# Patient Record
Sex: FEMALE | Race: WHITE | Employment: FULL TIME | URBAN - METROPOLITAN AREA
[De-identification: names, ages, dates, MRNs, and addresses within clinical notes are randomized per-mention and may not be internally consistent; named-entity substitution may affect disease eponyms.]

---

## 2023-11-15 ENCOUNTER — ANCILLARY PROCEDURE (OUTPATIENT)
Dept: RADIOLOGY | Facility: CLINIC | Age: 38
End: 2023-11-15
Payer: COMMERCIAL

## 2023-11-15 ENCOUNTER — OFFICE VISIT (OUTPATIENT)
Dept: ORTHOPEDIC SURGERY | Facility: CLINIC | Age: 38
End: 2023-11-15
Payer: COMMERCIAL

## 2023-11-15 DIAGNOSIS — M25.572 LEFT ANKLE PAIN, UNSPECIFIED CHRONICITY: ICD-10-CM

## 2023-11-15 DIAGNOSIS — M76.822 POSTERIOR TIBIAL TENDON DYSFUNCTION (PTTD) OF LEFT LOWER EXTREMITY: Primary | ICD-10-CM

## 2023-11-15 PROCEDURE — 99213 OFFICE O/P EST LOW 20 MIN: CPT | Performed by: STUDENT IN AN ORGANIZED HEALTH CARE EDUCATION/TRAINING PROGRAM

## 2023-11-15 PROCEDURE — L1902 AFO ANKLE GAUNTLET PRE OTS: HCPCS | Performed by: STUDENT IN AN ORGANIZED HEALTH CARE EDUCATION/TRAINING PROGRAM

## 2023-11-15 PROCEDURE — 73610 X-RAY EXAM OF ANKLE: CPT | Mod: LT,FY

## 2023-11-15 PROCEDURE — 99203 OFFICE O/P NEW LOW 30 MIN: CPT | Performed by: STUDENT IN AN ORGANIZED HEALTH CARE EDUCATION/TRAINING PROGRAM

## 2023-11-15 PROCEDURE — 73610 X-RAY EXAM OF ANKLE: CPT | Mod: LEFT SIDE | Performed by: RADIOLOGY

## 2023-11-15 RX ORDER — DICLOFENAC SODIUM 10 MG/G
4 GEL TOPICAL 4 TIMES DAILY PRN
Qty: 450 G | Refills: 0 | Status: SHIPPED | OUTPATIENT
Start: 2023-11-15 | End: 2024-01-14

## 2023-11-15 ASSESSMENT — PAIN - FUNCTIONAL ASSESSMENT: PAIN_FUNCTIONAL_ASSESSMENT: 0-10

## 2023-11-15 ASSESSMENT — PAIN DESCRIPTION - DESCRIPTORS: DESCRIPTORS: SHARP

## 2023-11-15 ASSESSMENT — PAIN SCALES - GENERAL: PAINLEVEL_OUTOF10: 5 - MODERATE PAIN

## 2023-11-16 NOTE — PROGRESS NOTES
ORTHOPAEDIC SURGERY HISTORY & PHYSICAL     Chief Complaint:  Left ankle pain    History Of Present Illness  Maxine Marie is a 38 y.o. female who presents for evaluation of left ankle pain.  Patient reports a several month history of left ankle pain.  Patient pain is typically rated as 5 out of 10.  This began atraumatically and is not associated with any numbness, tingling or weakness.  Patient has noticed more pain with prolonged standing and walking and when going up onto her toes.  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  Patient has never had a pain like this in the past.  She has not used any orthotics, braces or inserts for her shoes.  She is also not formally tried HEP, PT or bracing.     Past Medical History  Past Medical History:   Diagnosis Date    Obesity complicating pregnancy, unspecified trimester 07/07/2014    Obesity in pregnancy, antepartum    Other conditions influencing health status 07/07/2014    History of pregnancy    Personal history of other diseases of the respiratory system     History of asthma    Personal history of other specified conditions     History of chest pain       Surgical History  Recent Surgeries in Orthopaedic Surgery            No cases to display             Social History  Social History     Socioeconomic History    Marital status:      Spouse name: None    Number of children: None    Years of education: None    Highest education level: None   Occupational History    None   Tobacco Use    Smoking status: None    Smokeless tobacco: None   Substance and Sexual Activity    Alcohol use: None    Drug use: None    Sexual activity: None   Other Topics Concern    None   Social History Narrative    None     Social Determinants of Health     Financial Resource Strain: Not on file   Food Insecurity: Not on file   Transportation Needs: Not on file   Physical Activity: Not on file   Stress: Not on file   Social Connections:  Not on file   Intimate Partner Violence: Not on file   Housing Stability: Not on file       Family History  No family history on file.     Allergies  No Known Allergies    Review of Systems  REVIEW OF SYSTEMS  Constitutional: no unplanned weight loss  Psychiatric: no suicidal ideation  ENT: no vision changes, no sinus problems  Pulmonary: no shortness of breath during office visit today  Lymphatic: no enlarged lymph nodes  Cardiovascular: no chest pain or shortness of breath during office visit today  Gastrointestinal: no stomach problems  Genitourinary: no dysuria   Skin: no rashes  Endocrine: no thyroid problems  Neurological: no headache, no numbness  Hematological: no easy bruising  Musculoskeletal: Left ankle pain    Physical Exam  PHYSICAL EXAMINATION  Constitutional Exam: well developed and well nourished  Psychiatric Exam: alert and oriented, appropriate mood and behavior  Eye Exam: EOMI  Pulmonary Exam: breathing non-labored, no apparent distress  Lymphatic exam: no appreciable lymphadenopathy in the lower extremities  Cardiovascular exam: RRR to peripheral palpation, DP pulses 2+, PT 2+, toes are pink with good capillary refill, no pitting edema  Skin exam: no open lesions, rashes, abrasions or ulcerations  Neurological exam: sensation to light touch intact in both lower extremities in peripheral and dermatomal distributions (except for any abnormalities noted in musculoskeletal exam)    Musculoskeletal exam: Left lower extremity examination.  Patient pain localizes about the plantar medial ankle.  She is focally tender to palpation along the PTT, there is mild bogginess.  Patient is nontender to palpation about the medial longitudinal band of the plantar fascia.  Patient has neutral hindfoot alignment with relative cavus arch posture and no significant forefoot deformity noted.  Patient has sensation intact to light touch grossly in a saphenous, sural, superficial peroneal, deep peroneal and tibial nerve  distribution.  She has 5 out of 5 strength in plantarflexion, dorsiflexion and EHL.  She has 4- out of 5 strength in inversion, pain limited and 5 out of 5 strength in eversion.  Patient has 2+ DP/PT pulse palpated.    Last Recorded Vitals  There were no vitals taken for this visit.    Laboratory Results    No results found for this or any previous visit (from the past 24 hour(s)).    Radiology Results   X-ray imaging 3 view weightbearing left ankle reviewed from 11/15/2023 and independently evaluated by me demonstrates no acute fracture or dislocation.  Ankle mortise remains well aligned.  Incidental note of calcaneal enthesophyte and plantar calcaneal spur.    Assessment/Plan:  38-year-old female who my impression has left PTTD.  I have reviewed the diagnosis and treatment options extensively with the patient.  In my impression, the patient may continue weightbearing as tolerated in her left lower extremity.  I will provide her with a formal referral to physical therapy to work on PTT stretching and strengthening.  I will provide her with a prescription for a topical anti-inflammatory for use directly over the painful area.  I encouraged the patient to walk for necessity and to take great care with respect to overloading her foot.  I encouraged the patient to obtain OTC arch support for offloading of her PTT.  I will plan to see the patient back in approximately 6 weeks to monitor response to treatment.  Upon return, patient would not require further imaging.    Tristan Machuca MD, KARLOS  Department of Orthopaedic Surgery  [facility]    The diagnosis and treatment plan were reviewed with the patient. All questions were answered. The patient verbalized understanding of the treatment plan. There were no barriers to understanding identified.    Note dictated with PiAuto software.  Completed without full type editing and sent to avoid delay.

## 2023-12-27 ENCOUNTER — APPOINTMENT (OUTPATIENT)
Dept: ORTHOPEDIC SURGERY | Facility: CLINIC | Age: 38
End: 2023-12-27
Payer: COMMERCIAL

## 2025-04-25 ENCOUNTER — EVALUATION (OUTPATIENT)
Dept: PHYSICAL THERAPY | Facility: CLINIC | Age: 40
End: 2025-04-25
Payer: COMMERCIAL

## 2025-04-25 DIAGNOSIS — M25.562 CHRONIC PAIN OF LEFT KNEE: Chronic | ICD-10-CM

## 2025-04-25 DIAGNOSIS — M70.52 BURSITIS OF LEFT KNEE: Primary | ICD-10-CM

## 2025-04-25 DIAGNOSIS — R26.9 GAIT ABNORMALITY: Chronic | ICD-10-CM

## 2025-04-25 DIAGNOSIS — G89.29 CHRONIC PAIN OF LEFT KNEE: Chronic | ICD-10-CM

## 2025-04-25 PROCEDURE — 97110 THERAPEUTIC EXERCISES: CPT | Mod: GP

## 2025-04-25 PROCEDURE — 97162 PT EVAL MOD COMPLEX 30 MIN: CPT | Mod: GP

## 2025-04-25 ASSESSMENT — PAIN SCALES - GENERAL: PAINLEVEL_OUTOF10: 2

## 2025-04-25 ASSESSMENT — ENCOUNTER SYMPTOMS
PAIN SCALE: 2
OCCASIONAL FEELINGS OF UNSTEADINESS: 1
PAIN SCALE AT LOWEST: 1
LOSS OF SENSATION IN FEET: 0
DEPRESSION: 0
PAIN SCALE AT HIGHEST: 8

## 2025-04-25 ASSESSMENT — PAIN - FUNCTIONAL ASSESSMENT: PAIN_FUNCTIONAL_ASSESSMENT: 0-10

## 2025-04-25 ASSESSMENT — ACTIVITIES OF DAILY LIVING (ADL): POOR_BALANCE: 1

## 2025-04-25 NOTE — LETTER
April 28, 2025    Micah Martel MD  [address]  [place] [address]    Patient: Maxine Marie   YOB: 1985   Date of Visit: 4/25/2025       Dear Micah Martel MD  [address]  [place] [address]    The attached plan of care is being sent to you because your patient’s medical reimbursement requires that you certify the plan of care. Your signature is required to allow uninterrupted insurance coverage.      You may indicate your approval by signing below and faxing this form back to us at Dept Fax: [phone].    Please call Dept: [phone] with any questions or concerns.    Thank you for this referral,        Steve Dunlap PT  [facility] PHYSICIAN [place]  [facility] PHYSICIAN [place]  [address]   [facility] [id number] [phone]    Payer: Payor: [employer] / Plan: [employer] / Product Type: *No Product type* /                                                                         Date:     Dear Steve Dunlap, PT,     Re: Ms. Maxine Marie, MRN:[id number]    I certify that I have reviewed the attached plan of care and it is medically necessary for Ms. Maxine Marie (1985) who is under my care.          ______________________________________                    _________________  Provider name and credentials                                           Date and time                                                                                           Plan of Care 4/25/25   Effective from: 4/25/2025  Effective to: 7/24/2025    Active  Plan ID: [id number]               Participants as of 4/25/2025      Name Type Comments Contact Info    Micah Martel MD Referring Provider  [phone]    Steve Dunlap PT Physical Therapist            Last Plan Note       Author: Steve Dunlap PT Status: Incomplete Last edited: 4/25/2025  1:30 PM         Physical Therapy Evaluation and Treatment     Patient Name: Maxine Marie  MRN: [id number]  Encounter  "date: 4/25/2025       Visit # 1 of 16  Visits/Dates Authorized: 2025: [employer] - NO AUTH / 100% COVERAGE / 30V/yr - Check w/pt, 0 used per Epic / Availity [id number]   / ds 4/24/25 //  CPT 24246, 20561 - covered - No auth until auth is required for therapy //      Current Problem:   Problem List Items Addressed This Visit    None  Visit Diagnoses         Codes      Bursitis of left knee     M70.52          Precautions:          Steadi Fall Risk  One or more falls in the last year?    How many Times?    Was the patient injured in the fall?    Has trouble stepping onto curb?    Advised to use a cane or walker to get around safely?    Often has to rush to toilet?    Feels unsteady when walking?    Has lost some feeling in feet?    Often feels sad or depressed?    Steadies self on furniture while walking at home?    Takes medicine that makes them feel lightheaded or more tired than usual?    Worried about Falling?    Takes medicine to sleep or improve mood?    Needs to push with hands when rising from a chair?        Subjective Evaluation    History of Present Illness  Mechanism of injury: General:  Reason for Referral: ***  Referred By: ***  Past Medical History Relevant to Rehab: ***  Family/Caregiver Present: ***     Precautions:   STEADI Fall Risk Score 3  Additional worries with negotiation of stairs or getting in and out of shower secondary to reduced L knee ROM.   Medical Precautions: ***     Medical History Form: Reviewed (scanned into chart)    Allergies: no known allergies     Subjective:    Pt is a 38 y/o female who presents to outpatient physical therapy for complaints of L knee pain. 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 Pt is a mother of 3 kids with a supportive spouse. She reports she is very busy and is on her feet a lot. Notices pain increases when standing up after taking work break. Stiffness and pain increases upon standing after sitting for 15-20 minutes. \"I just feel " "unstable when I'm walking.\"  Additional complaints of R ankle pain (on the lateral aspect).  Home set-up split level with handrails   1STE no handrail  Functional limitations: work  Assistive device: no  Caregiver / family support system: yes  Aggravating symptoms: stairs, returning to activity levels after 15-20 seated rest break.   Relieving symptoms: rest, Tylenol (as needed), occasionally icing.       Pain  Current pain ratin  At best pain ratin  At worst pain ratin              Objective     Tenderness   Left Knee   Tenderness in the ITB, LCL (proximal) and MCL (proximal).     Right Knee   Tenderness in the ITB, LCL (proximal) and MCL (proximal).     Additional Tenderness Details  TTP   Medial and lateral   VMO  Medial and lateral aspects of proximal GS    Active Range of Motion   Left Knee   Flexion: 104 degrees with pain  Extension: WFL    Right Knee   Flexion: 116 degrees   Extension: WFL  Left Ankle/Foot   Plantar flexion: WFL  Inversion: WFL  Eversion: WFL    Right Ankle/Foot   Plantar flexion: WFL  Inversion: WFL  Eversion: WFL    Additional Active Range of Motion Details  Significant tightness in L quad (unable to get to 90 deg)  Tightness with dorsiflexion and HS stretch       Strength/Myotome Testing     Left Ankle/Foot   Dorsiflexion: 4+  Plantar flexion: 4+    Right Ankle/Foot   Dorsiflexion: 4  Plantar flexion: 4    Tests     Left Knee   Positive varus stress test at 30 degrees.   Negative valgus stress test at 30 degrees.     Ambulation     Ambulation: Stairs   Ascend stairs: independent  Pattern: reciprocal  Railings: two rails  Descend stairs: independent  Pattern: non-reciprocal  Railings: without rails    Additional Stairs Ambulation Details  Step too pattern for descending LLE leading    Observational Gait   Gait: antalgic   Decreased walking speed and stride length.     Additional Observational Gait Details  Reduced WB on LLE       Vitals:       Objective     Tenderness   Left " Knee   Tenderness in the ITB, LCL (proximal) and MCL (proximal).     Right Knee   Tenderness in the ITB, LCL (proximal) and MCL (proximal).     Additional Tenderness Details  TTP   Medial and lateral   VMO  Medial and lateral aspects of proximal GS    Active Range of Motion   Left Knee   Flexion: 104 degrees with pain  Extension: WFL    Right Knee   Flexion: 116 degrees   Extension: WFL  Left Ankle/Foot   Plantar flexion: WFL  Inversion: WFL  Eversion: WFL    Right Ankle/Foot   Plantar flexion: WFL  Inversion: WFL  Eversion: WFL    Additional Active Range of Motion Details  Significant tightness in L quad (unable to get to 90 deg)  Tightness with dorsiflexion and HS stretch       Strength/Myotome Testing     Left Ankle/Foot   Dorsiflexion: 4+  Plantar flexion: 4+    Right Ankle/Foot   Dorsiflexion: 4  Plantar flexion: 4    Tests     Left Knee   Positive varus stress test at 30 degrees.   Negative valgus stress test at 30 degrees.     Ambulation     Ambulation: Stairs   Ascend stairs: independent  Pattern: reciprocal  Railings: two rails  Descend stairs: independent  Pattern: non-reciprocal  Railings: without rails    Additional Stairs Ambulation Details  Step too pattern for descending LLE leading    Observational Gait   Gait: antalgic   Decreased walking speed and stride length.     Additional Observational Gait Details  Reduced WB on LLE       Objective     Tenderness   Left Knee   Tenderness in the ITB, LCL (proximal) and MCL (proximal).     Right Knee   Tenderness in the ITB, LCL (proximal) and MCL (proximal).     Additional Tenderness Details  TTP   Medial and lateral   VMO  Medial and lateral aspects of proximal GS    Active Range of Motion   Left Knee   Flexion: 104 degrees with pain  Extension: WFL    Right Knee   Flexion: 116 degrees   Extension: WFL  Left Ankle/Foot   Plantar flexion: WFL  Inversion: WFL  Eversion: WFL    Right Ankle/Foot   Plantar flexion: WFL  Inversion: WFL  Eversion: WFL    Additional  Active Range of Motion Details  Significant tightness in L quad (unable to get to 90 deg)  Tightness with dorsiflexion and HS stretch       Strength/Myotome Testing     Left Ankle/Foot   Dorsiflexion: 4+  Plantar flexion: 4+    Right Ankle/Foot   Dorsiflexion: 4  Plantar flexion: 4    Tests     Left Knee   Positive varus stress test at 30 degrees.   Negative valgus stress test at 30 degrees.     Ambulation     Ambulation: Stairs   Ascend stairs: independent  Pattern: reciprocal  Railings: two rails  Descend stairs: independent  Pattern: non-reciprocal  Railings: without rails    Additional Stairs Ambulation Details  Step too pattern for descending LLE leading    Observational Gait   Gait: antalgic   Decreased walking speed and stride length.     Additional Observational Gait Details  Reduced WB on LLE            Balance:  {Balance Assessments:92580}    Other Outcome Measures:       Treatments:     Therapeutic Exercise 89302:     Neuromuscular Re-Ed 88415:     Therapeutic Activity 26566:     Gait Training 00450:     Manual Therapy 66681:     Modalities:   {Modalities Used:00620}      HEP / Access Codes:   Unable to perform SLR secondary to pain  Access Code: [email or web address]  URL: [email or web address]  Date: 04/25/2025  Prepared by: Steve Dunlap    Exercises  - Seated Gluteal Sets  - 2 x daily - 7 x weekly - 3 sets - 10 reps  - Supine Quad Set  - 2 x daily - 7 x weekly - 3 sets - 10 reps  - Supine Bridge with Gluteal Set and Spinal Articulation  - 2 x daily - 7 x weekly - 1 sets - 10 reps  - Supine Heel Slide  - 2 x daily - 7 x weekly - 2 sets - 10 reps    Assessment/Plan      {Complexities:87534}     Post-Treatment Symptoms:       Goals:

## 2025-04-25 NOTE — PROGRESS NOTES
Physical Therapy Evaluation and Treatment     Patient Name: Maxine Marie  MRN: [id number]  Encounter date: 4/25/2025  Time Calculation  Start Time: 1342  Stop Time: 1431  Time Calculation (min): 49 min  PT Evaluation Time Entry  PT Evaluation (Moderate) Time Entry: 34  PT Therapeutic Procedures Time Entry  Therapeutic Exercise Time Entry: 15    Visit # 1 of 16  Visits/Dates Authorized: 2025: CARESOJOSÉ MIGUEL MCAID - NO AUTH / 100% COVERAGE / 30V/yr - Check w/pt, 0 used per Epic / [employer] [id number]   / ds 4/24/25 //  CPT 20560, 20561 - covered - No auth until auth is required for therapy //      Current Problem:   Problem List Items Addressed This Visit           ICD-10-CM    Left knee pain (Chronic) M25.562    Gait abnormality (Chronic) R26.9    Bursitis of left knee - Primary M70.52    Relevant Orders    Follow Up In Physical Therapy     Precautions:  Precautions  STEADI Fall Risk Score (The score of 4 or more indicates an increased risk of falling): 3       Steadi Fall Risk  One or more falls in the last year? No  How many Times?    Was the patient injured in the fall?    Has trouble stepping onto curb? No  Advised to use a cane or walker to get around safely? No  Often has to rush to toilet? No  Feels unsteady when walking? Yes  Has lost some feeling in feet? No  Often feels sad or depressed? No  Steadies self on furniture while walking at home? No  Takes medicine that makes them feel lightheaded or more tired than usual? Yes  Worried about Falling? No  Takes medicine to sleep or improve mood? No  Needs to push with hands when rising from a chair? Yes      Subjective Evaluation    History of Present Illness  Mechanism of injury: General:  Reason for Referral: Bursitis of left knee [M70.52]  Referred By: Dr Martel  Past Medical History Relevant to Rehab: Right ankle injury, high BMI  Family/Caregiver Present: none     Precautions:   STEADI Fall Risk Score 3  Additional worries with negotiation of stairs or  "getting in and out of shower secondary to reduced L knee ROM.   Medical Precautions: dizziness and fainting, unusual fatigue, anxiety     Medical History Form: Reviewed (scanned into chart)    Allergies: no known allergies     Subjective:    Pt is a 38 y/o female who presents to outpatient physical therapy for complaints of L knee pain. 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 Pt is a mother of 3 kids with a supportive spouse. She reports she is very busy and is on her feet a lot. Notices pain increases when standing up after taking work break. Stiffness and pain increases upon standing after sitting for 15-20 minutes. \"I just feel unstable when I'm walking.\"  Additional complaints of R ankle pain (on the lateral aspect).  Home set-up split level with handrails   1STE no handrail  Functional limitations: work  Assistive device: no  Caregiver / family support system: yes  Aggravating symptoms: stairs, returning to activity levels after 15-20 seated rest break.   Relieving symptoms: rest, Tylenol (as needed), occasionally icing.       Pain  Current pain ratin  At best pain ratin  At worst pain ratin         Pain Assessment: 0-10  0-10 (Numeric) Pain Score: 2  Pain Location: Knee  Pain Orientation: Left    Objective     Tenderness   Left Knee   Tenderness in the ITB, lateral joint line, LCL (proximal), MCL (proximal) and medial joint line. No tenderness in the quadriceps tendon, superior patella and tibial tubercle.     Right Knee   Tenderness in the ITB, LCL (proximal) and MCL (proximal).     Additional Tenderness Details  TTP   Medial and lateral   VMO  Medial and lateral aspects of proximal GS    Active Range of Motion   Left Knee   Flexion: 104 degrees with pain  Extension: WFL    Right Knee   Flexion: 116 degrees   Extension: WFL  Left Ankle/Foot   Plantar flexion: WFL  Inversion: WFL  Eversion: WFL    Right Ankle/Foot   Plantar flexion: WFL  Inversion: WFL  Eversion: WFL    Additional " Active Range of Motion Details  Significant tightness in L quad (unable to get to 90 deg)  Tightness with dorsiflexion and HS stretch       Strength/Myotome Testing     Left Hip   Planes of Motion   Flexion: 3  Extension: 3  Abduction: 3+    Isolated Muscles   Gluteus gustavo: 3-    Right Hip   Planes of Motion   Flexion: 4+  Extension: 4-  Abduction: 4    Isolated Muscles   Gluteus maximums: 4    Left Knee   Flexion: 4  Extension: 4    Right Knee   Flexion: 4+  Extension: 4+    Left Ankle/Foot   Dorsiflexion: 4+  Plantar flexion: 4+    Right Ankle/Foot   Dorsiflexion: 4  Plantar flexion: 4    Additional Strength Details  Hip flexion tested with SLR in supine Unable to perform SLR with resistance on L Leg for hip flexion.     Tests     Left Knee   Positive varus stress test at 30 degrees.   Negative valgus stress test at 30 degrees.     Additional Tests Details  Held further orthopedic testing secondary to current symptoms and avoiding further flare up.    Swelling     Left Knee Girth Measurement (cm)   Joint line: 53 cm    Right Knee Girth Measurement (cm)   Joint line: 52 cm    Ambulation     Ambulation: Stairs   Ascend stairs: independent  Pattern: reciprocal  Railings: two rails  Descend stairs: independent  Pattern: non-reciprocal  Railings: without rails    Additional Stairs Ambulation Details  Step too pattern for descending LLE leading    Observational Gait   Gait: antalgic   Decreased walking speed and stride length.     Additional Observational Gait Details  Reduced WB on LLE       Vitals:       Objective     Tenderness   Left Knee   Tenderness in the ITB, lateral joint line, LCL (proximal), MCL (proximal) and medial joint line. No tenderness in the quadriceps tendon, superior patella and tibial tubercle.     Right Knee   Tenderness in the ITB, LCL (proximal) and MCL (proximal).     Additional Tenderness Details  TTP   Medial and lateral   VMO  Medial and lateral aspects of proximal GS    Active Range of  Motion   Left Knee   Flexion: 104 degrees with pain  Extension: WFL    Right Knee   Flexion: 116 degrees   Extension: WFL  Left Ankle/Foot   Plantar flexion: WFL  Inversion: WFL  Eversion: WFL    Right Ankle/Foot   Plantar flexion: WFL  Inversion: WFL  Eversion: WFL    Additional Active Range of Motion Details  Significant tightness in L quad (unable to get to 90 deg)  Tightness with dorsiflexion and HS stretch       Strength/Myotome Testing     Left Hip   Planes of Motion   Flexion: 3  Extension: 3  Abduction: 3+    Isolated Muscles   Gluteus gustavo: 3-    Right Hip   Planes of Motion   Flexion: 4+  Extension: 4-  Abduction: 4    Isolated Muscles   Gluteus maximums: 4    Left Knee   Flexion: 4  Extension: 4    Right Knee   Flexion: 4+  Extension: 4+    Left Ankle/Foot   Dorsiflexion: 4+  Plantar flexion: 4+    Right Ankle/Foot   Dorsiflexion: 4  Plantar flexion: 4    Additional Strength Details  Hip flexion tested with SLR in supine Unable to perform SLR with resistance on L Leg for hip flexion.     Tests     Left Knee   Positive varus stress test at 30 degrees.   Negative valgus stress test at 30 degrees.     Additional Tests Details  Held further orthopedic testing secondary to current symptoms and avoiding further flare up.    Swelling     Left Knee Girth Measurement (cm)   Joint line: 53 cm    Right Knee Girth Measurement (cm)   Joint line: 52 cm    Ambulation     Ambulation: Stairs   Ascend stairs: independent  Pattern: reciprocal  Railings: two rails  Descend stairs: independent  Pattern: non-reciprocal  Railings: without rails    Additional Stairs Ambulation Details  Step too pattern for descending LLE leading    Observational Gait   Gait: antalgic   Decreased walking speed and stride length.     Additional Observational Gait Details  Reduced WB on LLE       Objective     Tenderness   Left Knee   Tenderness in the ITB, lateral joint line, LCL (proximal), MCL (proximal) and medial joint line. No tenderness  in the quadriceps tendon, superior patella and tibial tubercle.     Right Knee   Tenderness in the ITB, LCL (proximal) and MCL (proximal).     Additional Tenderness Details  TTP   Medial and lateral   VMO  Medial and lateral aspects of proximal GS    Active Range of Motion   Left Knee   Flexion: 104 degrees with pain  Extension: WFL    Right Knee   Flexion: 116 degrees   Extension: WFL  Left Ankle/Foot   Plantar flexion: WFL  Inversion: WFL  Eversion: WFL    Right Ankle/Foot   Plantar flexion: WFL  Inversion: WFL  Eversion: WFL    Additional Active Range of Motion Details  Significant tightness in L quad (unable to get to 90 deg)  Tightness with dorsiflexion and HS stretch       Strength/Myotome Testing     Left Hip   Planes of Motion   Flexion: 3  Extension: 3  Abduction: 3+    Isolated Muscles   Gluteus gustavo: 3-    Right Hip   Planes of Motion   Flexion: 4+  Extension: 4-  Abduction: 4    Isolated Muscles   Gluteus maximums: 4    Left Knee   Flexion: 4  Extension: 4    Right Knee   Flexion: 4+  Extension: 4+    Left Ankle/Foot   Dorsiflexion: 4+  Plantar flexion: 4+    Right Ankle/Foot   Dorsiflexion: 4  Plantar flexion: 4    Additional Strength Details  Hip flexion tested with SLR in supine Unable to perform SLR with resistance on L Leg for hip flexion.     Tests     Left Knee   Positive varus stress test at 30 degrees.   Negative valgus stress test at 30 degrees.     Additional Tests Details  Held further orthopedic testing secondary to current symptoms and avoiding further flare up.    Swelling     Left Knee Girth Measurement (cm)   Joint line: 53 cm    Right Knee Girth Measurement (cm)   Joint line: 52 cm    Ambulation     Ambulation: Stairs   Ascend stairs: independent  Pattern: reciprocal  Railings: two rails  Descend stairs: independent  Pattern: non-reciprocal  Railings: without rails    Additional Stairs Ambulation Details  Step too pattern for descending LLE leading    Observational Gait   Gait:  "antalgic   Decreased walking speed and stride length.     Additional Observational Gait Details  Reduced WB on LLE            Balance:  Romberg: Firm Eyes Open 30 sec, Eyes Closed 30 sec   Tandem: Firm Eyes Open 20 sec, Eyes Closed <5 sec     Other Outcome Measures:  Other Measures  Other Outcome Measures: LEFS 45/80    Treatments:       Eval 34 min    Therapeutic Exercise 13268:   Extra time spend on performance / education / issuance of HEP  Exercises  - Seated Gluteal Sets  - 2 x daily - 7 x weekly - 3 sets - 10 reps  - Supine Quad Set  - 2 x daily - 7 x weekly - 3 sets - 10 reps  - Supine Bridge with Gluteal Set and Spinal Articulation  - 2 x daily - 7 x weekly - 1 sets - 10 reps  - Supine Heel Slide  - 2 x daily - 7 x weekly - 2 sets - 10 reps    HEP / Access Codes:   Unable to perform SLR secondary to pain  Access Code: [email or web address]  URL: [email or web address]  Date: 04/25/2025  Prepared by: Steve Dunlap    Exercises  - Seated Gluteal Sets  - 2 x daily - 7 x weekly - 3 sets - 10 reps  - Supine Quad Set  - 2 x daily - 7 x weekly - 3 sets - 10 reps  - Supine Bridge with Gluteal Set and Spinal Articulation  - 2 x daily - 7 x weekly - 1 sets - 10 reps  - Supine Heel Slide  - 2 x daily - 7 x weekly - 2 sets - 10 reps    Assessment & Plan     Assessment  Impairments: abnormal gait, abnormal muscle firing, abnormal or restricted ROM, activity intolerance, impaired balance, impaired physical strength, lacks appropriate home exercise program, pain with function and weight-bearing intolerance  Assessment details: Pt is a 38 y/o female who presents to outpatient physical therapy with \"Bursitis of left knee\" resulting in L knee pain. Pt prognosis to therapy is fair secondary to BMI, job related duties/tasks, reduced activity, and pain levels. However, pt is motivated to participate in therapy and come regularly. Pt presents with reduced ROM, strength, alterations in gait/balance, and lacks an appropriate HEP. Pt would " benefit from skilled PT services to address deficits noted above, improve strength, ROM, and balance/gait.  Prognosis: good    Plan  Planned modality interventions: cryotherapy, TENS, ultrasound, thermotherapy (hydrocollator packs), electrical stimulation/Russian stimulation and low level laser therapy  Other planned modality interventions: consider DN potentially however at eval pt is hesitant.  Planned therapy interventions: manual therapy, strengthening, stretching, therapeutic activities, functional ROM exercises, joint mobilization, flexibility, home exercise program, balance/weight-bearing training, gait training, soft tissue mobilization, neuromuscular re-education, motor coordination training, abdominal trunk stabilization, ADL retraining, body mechanics training, IADL retraining, muscle pump exercises, orthotic fitting/training and transfer training  Frequency: 2x week  Duration in visits: 16  Treatment plan discussed with: patient  Plan details: 1-2 x per week for 16 visits          Moderate complexity due to patient's clinical presentation being evolving with changing characteristics, with comorbidities/complexities to include BMI, job related duties, intolerance to WB and lack of activity levels, all of which may negatively impact rehab tolerance and progression.     Post-Treatment Symptoms:       Goals:   Active       PT Problem       PT Goal 1       Start:  04/25/25    Expected End:  05/30/25       Pt to improve pain free AROM of L knee to be comparable with RLE.         PT Goal 2       Start:  04/25/25    Expected End:  06/13/25       Pt will improve LE strength to 4+/5 MMT or greater to improve knee stability and ability to work, lift, titi loads with minimal pain.         PT Goal 3       Start:  04/25/25    Expected End:  06/13/25       Pt to improve LEFS score by x 1 LUCIA to improve ability to squat and perform stairs independently without pain.          Patient Stated Goal 1       Start:  04/25/25     Expected End:  06/13/25       Pt will be able to work a full shift at work with 2/10 pain or less at worst to improve pts ability to manage work related activities.         Patient Stated Goal 2       Start:  04/25/25    Expected End:  06/13/25       Pt will be able to initiate movement and ambulate after taking work break (20-30 min) without pain.

## 2025-04-25 NOTE — Clinical Note
April 28, 2025    Micah Martel MD  [address] [place] [address]  [address]  [place] [address]    Patient: Maxine Marie   YOB: 1985   Date of Visit: 4/25/2025       Dear Micah Martel MD  [address]  [address]  [place] [address]    The attached plan of care is being sent to you because your patient’s medical reimbursement requires that you certify the plan of care. Your signature is required to allow uninterrupted insurance coverage.      You may indicate your approval by signing below and faxing this form back to us at Dept Fax: [phone].    Please call Dept: [phone] with any questions or concerns.    Thank you for this referral,        Steve Dunlap PT  [facility] PHYSICIAN ALFREDO  [facility] PHYSICIAN ALFREDO  [address] [facility] [id number] [phone]    Payer: Payor: [employer] / Plan: [employer] / Product Type: *No Product type* /                                                                         Date:     Dear Steve Dunlap PT,     Re: Ms. Maxine Marie, MRN:[id number]    I certify that I have reviewed the attached plan of care and it is medically necessary for Ms. Maxine Marie (1985) who is under my care.          ______________________________________                    _________________  Provider name and credentials                                           Date and time                                                                                           Plan of Care 4/25/25   Effective from: 4/25/2025  Effective to: 7/24/2025    Plan ID: [id number]            Participants as of Finalize on 4/28/2025    Name Type Comments Contact Info    Micah Martel MD Referring Provider  [phone]    Steve Dunlap PT Physical Therapist         Last Plan Note     Author: Steve Dunlap PT Status: Sign when Signing Visit Last edited: 4/25/2025  1:30 PM       Physical Therapy Evaluation and Treatment     Patient Name: Maxine Marie  MRN:  [id number]  Encounter date: 4/25/2025  Time Calculation  Start Time: 1342  Stop Time: 1431  Time Calculation (min): 49 min  PT Evaluation Time Entry  PT Evaluation (Moderate) Time Entry: 34  PT Therapeutic Procedures Time Entry  Therapeutic Exercise Time Entry: 15    Visit # 1 of 16  Visits/Dates Authorized: 2025: [employer] - NO AUTH / 100% COVERAGE / 30V/yr - Check w/pt, 0 used per Epic / Availity [id number]   / ds 4/24/25 //  CPT 20560, 20561 - covered - No auth until auth is required for therapy //      Current Problem:   Problem List Items Addressed This Visit           ICD-10-CM    Left knee pain (Chronic) M25.562    Gait abnormality (Chronic) R26.9    Bursitis of left knee - Primary M70.52    Relevant Orders    Follow Up In Physical Therapy     Precautions:  Precautions  STEADI Fall Risk Score (The score of 4 or more indicates an increased risk of falling): 3       Steadi Fall Risk  One or more falls in the last year? No  How many Times?    Was the patient injured in the fall?    Has trouble stepping onto curb? No  Advised to use a cane or walker to get around safely? No  Often has to rush to toilet? No  Feels unsteady when walking? Yes  Has lost some feeling in feet? No  Often feels sad or depressed? No  Steadies self on furniture while walking at home? No  Takes medicine that makes them feel lightheaded or more tired than usual? Yes  Worried about Falling? No  Takes medicine to sleep or improve mood? No  Needs to push with hands when rising from a chair? Yes      Subjective Evaluation    History of Present Illness  Mechanism of injury: General:  Reason for Referral: Bursitis of left knee [M70.52]  Referred By: Dr Martel  Past Medical History Relevant to Rehab: Right ankle injury, high BMI  Family/Caregiver Present: none     Precautions:   STEADI Fall Risk Score 3  Additional worries with negotiation of stairs or getting in and out of shower secondary to reduced L knee ROM.   Medical Precautions:  "dizziness and fainting, unusual fatigue, anxiety     Medical History Form: Reviewed (scanned into chart)    Allergies: no known allergies     Subjective:    Pt is a 38 y/o female who presents to outpatient physical therapy for complaints of L knee pain. 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 Pt is a mother of 3 kids with a supportive spouse. She reports she is very busy and is on her feet a lot. Notices pain increases when standing up after taking work break. Stiffness and pain increases upon standing after sitting for 15-20 minutes. \"I just feel unstable when I'm walking.\"  Additional complaints of R ankle pain (on the lateral aspect).  Home set-up split level with handrails   1STE no handrail  Functional limitations: work  Assistive device: no  Caregiver / family support system: yes  Aggravating symptoms: stairs, returning to activity levels after 15-20 seated rest break.   Relieving symptoms: rest, Tylenol (as needed), occasionally icing.       Pain  Current pain ratin  At best pain ratin  At worst pain ratin         Pain Assessment: 0-10  0-10 (Numeric) Pain Score: 2  Pain Location: Knee  Pain Orientation: Left    Objective     Tenderness   Left Knee   Tenderness in the ITB, lateral joint line, LCL (proximal), MCL (proximal) and medial joint line. No tenderness in the quadriceps tendon, superior patella and tibial tubercle.     Right Knee   Tenderness in the ITB, LCL (proximal) and MCL (proximal).     Additional Tenderness Details  TTP   Medial and lateral   VMO  Medial and lateral aspects of proximal GS    Active Range of Motion   Left Knee   Flexion: 104 degrees with pain  Extension: WFL    Right Knee   Flexion: 116 degrees   Extension: WFL  Left Ankle/Foot   Plantar flexion: WFL  Inversion: WFL  Eversion: WFL    Right Ankle/Foot   Plantar flexion: WFL  Inversion: WFL  Eversion: WFL    Additional Active Range of Motion Details  Significant tightness in L quad (unable to get to 90 " deg)  Tightness with dorsiflexion and HS stretch       Strength/Myotome Testing     Left Hip   Planes of Motion   Flexion: 3  Extension: 3  Abduction: 3+    Isolated Muscles   Gluteus gustavo: 3-    Right Hip   Planes of Motion   Flexion: 4+  Extension: 4-  Abduction: 4    Isolated Muscles   Gluteus maximums: 4    Left Knee   Flexion: 4  Extension: 4    Right Knee   Flexion: 4+  Extension: 4+    Left Ankle/Foot   Dorsiflexion: 4+  Plantar flexion: 4+    Right Ankle/Foot   Dorsiflexion: 4  Plantar flexion: 4    Additional Strength Details  Hip flexion tested with SLR in supine Unable to perform SLR with resistance on L Leg for hip flexion.     Tests     Left Knee   Positive varus stress test at 30 degrees.   Negative valgus stress test at 30 degrees.     Additional Tests Details  Held further orthopedic testing secondary to current symptoms and avoiding further flare up.    Swelling     Left Knee Girth Measurement (cm)   Joint line: 53 cm    Right Knee Girth Measurement (cm)   Joint line: 52 cm    Ambulation     Ambulation: Stairs   Ascend stairs: independent  Pattern: reciprocal  Railings: two rails  Descend stairs: independent  Pattern: non-reciprocal  Railings: without rails    Additional Stairs Ambulation Details  Step too pattern for descending LLE leading    Observational Gait   Gait: antalgic   Decreased walking speed and stride length.     Additional Observational Gait Details  Reduced WB on LLE       Vitals:       Objective     Tenderness   Left Knee   Tenderness in the ITB, lateral joint line, LCL (proximal), MCL (proximal) and medial joint line. No tenderness in the quadriceps tendon, superior patella and tibial tubercle.     Right Knee   Tenderness in the ITB, LCL (proximal) and MCL (proximal).     Additional Tenderness Details  TTP   Medial and lateral   VMO  Medial and lateral aspects of proximal GS    Active Range of Motion   Left Knee   Flexion: 104 degrees with pain  Extension: WFL    Right Knee    Flexion: 116 degrees   Extension: WFL  Left Ankle/Foot   Plantar flexion: WFL  Inversion: WFL  Eversion: WFL    Right Ankle/Foot   Plantar flexion: WFL  Inversion: WFL  Eversion: WFL    Additional Active Range of Motion Details  Significant tightness in L quad (unable to get to 90 deg)  Tightness with dorsiflexion and HS stretch       Strength/Myotome Testing     Left Hip   Planes of Motion   Flexion: 3  Extension: 3  Abduction: 3+    Isolated Muscles   Gluteus gustavo: 3-    Right Hip   Planes of Motion   Flexion: 4+  Extension: 4-  Abduction: 4    Isolated Muscles   Gluteus maximums: 4    Left Knee   Flexion: 4  Extension: 4    Right Knee   Flexion: 4+  Extension: 4+    Left Ankle/Foot   Dorsiflexion: 4+  Plantar flexion: 4+    Right Ankle/Foot   Dorsiflexion: 4  Plantar flexion: 4    Additional Strength Details  Hip flexion tested with SLR in supine Unable to perform SLR with resistance on L Leg for hip flexion.     Tests     Left Knee   Positive varus stress test at 30 degrees.   Negative valgus stress test at 30 degrees.     Additional Tests Details  Held further orthopedic testing secondary to current symptoms and avoiding further flare up.    Swelling     Left Knee Girth Measurement (cm)   Joint line: 53 cm    Right Knee Girth Measurement (cm)   Joint line: 52 cm    Ambulation     Ambulation: Stairs   Ascend stairs: independent  Pattern: reciprocal  Railings: two rails  Descend stairs: independent  Pattern: non-reciprocal  Railings: without rails    Additional Stairs Ambulation Details  Step too pattern for descending LLE leading    Observational Gait   Gait: antalgic   Decreased walking speed and stride length.     Additional Observational Gait Details  Reduced WB on LLE       Objective     Tenderness   Left Knee   Tenderness in the ITB, lateral joint line, LCL (proximal), MCL (proximal) and medial joint line. No tenderness in the quadriceps tendon, superior patella and tibial tubercle.     Right Knee    Tenderness in the ITB, LCL (proximal) and MCL (proximal).     Additional Tenderness Details  TTP   Medial and lateral   VMO  Medial and lateral aspects of proximal GS    Active Range of Motion   Left Knee   Flexion: 104 degrees with pain  Extension: WFL    Right Knee   Flexion: 116 degrees   Extension: WFL  Left Ankle/Foot   Plantar flexion: WFL  Inversion: WFL  Eversion: WFL    Right Ankle/Foot   Plantar flexion: WFL  Inversion: WFL  Eversion: WFL    Additional Active Range of Motion Details  Significant tightness in L quad (unable to get to 90 deg)  Tightness with dorsiflexion and HS stretch       Strength/Myotome Testing     Left Hip   Planes of Motion   Flexion: 3  Extension: 3  Abduction: 3+    Isolated Muscles   Gluteus gustavo: 3-    Right Hip   Planes of Motion   Flexion: 4+  Extension: 4-  Abduction: 4    Isolated Muscles   Gluteus maximums: 4    Left Knee   Flexion: 4  Extension: 4    Right Knee   Flexion: 4+  Extension: 4+    Left Ankle/Foot   Dorsiflexion: 4+  Plantar flexion: 4+    Right Ankle/Foot   Dorsiflexion: 4  Plantar flexion: 4    Additional Strength Details  Hip flexion tested with SLR in supine Unable to perform SLR with resistance on L Leg for hip flexion.     Tests     Left Knee   Positive varus stress test at 30 degrees.   Negative valgus stress test at 30 degrees.     Additional Tests Details  Held further orthopedic testing secondary to current symptoms and avoiding further flare up.    Swelling     Left Knee Girth Measurement (cm)   Joint line: 53 cm    Right Knee Girth Measurement (cm)   Joint line: 52 cm    Ambulation     Ambulation: Stairs   Ascend stairs: independent  Pattern: reciprocal  Railings: two rails  Descend stairs: independent  Pattern: non-reciprocal  Railings: without rails    Additional Stairs Ambulation Details  Step too pattern for descending LLE leading    Observational Gait   Gait: antalgic   Decreased walking speed and stride length.     Additional Observational  "Gait Details  Reduced WB on LLE            Balance:  Romberg: Firm Eyes Open 30 sec, Eyes Closed 30 sec   Tandem: Firm Eyes Open 20 sec, Eyes Closed <5 sec     Other Outcome Measures:  Other Measures  Other Outcome Measures: LEFS 45/80    Treatments:       Eval 34 min    Therapeutic Exercise 37743:   Extra time spend on performance / education / issuance of HEP  Exercises  - Seated Gluteal Sets  - 2 x daily - 7 x weekly - 3 sets - 10 reps  - Supine Quad Set  - 2 x daily - 7 x weekly - 3 sets - 10 reps  - Supine Bridge with Gluteal Set and Spinal Articulation  - 2 x daily - 7 x weekly - 1 sets - 10 reps  - Supine Heel Slide  - 2 x daily - 7 x weekly - 2 sets - 10 reps    HEP / Access Codes:   Unable to perform SLR secondary to pain  Access Code: [email or web address]  URL: [email or web address]  Date: 04/25/2025  Prepared by: Steve Dunlap    Exercises  - Seated Gluteal Sets  - 2 x daily - 7 x weekly - 3 sets - 10 reps  - Supine Quad Set  - 2 x daily - 7 x weekly - 3 sets - 10 reps  - Supine Bridge with Gluteal Set and Spinal Articulation  - 2 x daily - 7 x weekly - 1 sets - 10 reps  - Supine Heel Slide  - 2 x daily - 7 x weekly - 2 sets - 10 reps    Assessment & Plan     Assessment  Impairments: abnormal gait, abnormal muscle firing, abnormal or restricted ROM, activity intolerance, impaired balance, impaired physical strength, lacks appropriate home exercise program, pain with function and weight-bearing intolerance  Assessment details: Pt is a 40 y/o female who presents to outpatient physical therapy with \"Bursitis of left knee\" resulting in L knee pain. Pt prognosis to therapy is fair secondary to BMI, job related duties/tasks, reduced activity, and pain levels. However, pt is motivated to participate in therapy and come regularly. Pt presents with reduced ROM, strength, alterations in gait/balance, and lacks an appropriate HEP. Pt would benefit from skilled PT services to address deficits noted above, improve strength, " ROM, and balance/gait.  Prognosis: good    Plan  Planned modality interventions: cryotherapy, TENS, ultrasound, thermotherapy (hydrocollator packs), electrical stimulation/Russian stimulation and low level laser therapy  Other planned modality interventions: consider DN potentially however at eval pt is hesitant.  Planned therapy interventions: manual therapy, strengthening, stretching, therapeutic activities, functional ROM exercises, joint mobilization, flexibility, home exercise program, balance/weight-bearing training, gait training, soft tissue mobilization, neuromuscular re-education, motor coordination training, abdominal trunk stabilization, ADL retraining, body mechanics training, IADL retraining, muscle pump exercises, orthotic fitting/training and transfer training  Frequency: 2x week  Duration in visits: 16  Treatment plan discussed with: patient  Plan details: 1-2 x per week for 16 visits          Moderate complexity due to patient's clinical presentation being evolving with changing characteristics, with comorbidities/complexities to include BMI, job related duties, intolerance to WB and lack of activity levels, all of which may negatively impact rehab tolerance and progression.     Post-Treatment Symptoms:       Goals:   Active       PT Problem       PT Goal 1       Start:  04/25/25    Expected End:  05/30/25       Pt to improve pain free AROM of L knee to be comparable with RLE.         PT Goal 2       Start:  04/25/25    Expected End:  06/13/25       Pt will improve LE strength to 4+/5 MMT or greater to improve knee stability and ability to work, lift, titi loads with minimal pain.         PT Goal 3       Start:  04/25/25    Expected End:  06/13/25       Pt to improve LEFS score by x 1 LUCIA to improve ability to squat and perform stairs independently without pain.          Patient Stated Goal 1       Start:  04/25/25    Expected End:  06/13/25       Pt will be able to work a full shift at work with  2/10 pain or less at worst to improve pts ability to manage work related activities.         Patient Stated Goal 2       Start:  04/25/25    Expected End:  06/13/25       Pt will be able to initiate movement and ambulate after taking work break (20-30 min) without pain.                   Current Participants as of 4/28/2025    Name Type Comments Contact Info    Micah Martel MD Referring Provider  [phone]    Signature pending    Steve Dunlap, PT Physical Therapist

## 2025-04-25 NOTE — LETTER
April 28, 2025    Micah Martel MD  [address] [place] [address]  [address]  [place] [address]    Patient: Maxine Marie   YOB: 1985   Date of Visit: 4/25/2025       Dear Micah Martel MD  [address] [place]  [address]  [place] [address]    The attached plan of care is being sent to you because your patient’s medical reimbursement requires that you certify the plan of care. Your signature is required to allow uninterrupted insurance coverage.      You may indicate your approval by signing below and faxing this form back to us at Dept Fax: [phone].    Please call Dept: [phone] with any questions or concerns.    Thank you for this referral,        Steve Dunlap PT  [facility] PHYSICIAN ALFREDO  [facility] PHYSICIAN ALFREDO  [address] [facility] [id number] [phone]    Payer: Payor: [employer] / Plan: [employer] / Product Type: *No Product type* /                                                                         Date:     Dear Steve Dunlap PT,     Re: Ms. aMxine Marie, MRN:[id number]    I certify that I have reviewed the attached plan of care and it is medically necessary for Ms. Maxine Marie (1985) who is under my care.          ______________________________________                    _________________  Provider name and credentials                                           Date and time                                                                                           Plan of Care 4/25/25   Effective from: 4/25/2025  Effective to: 7/24/2025    Plan ID: [id number]            Participants as of Finalize on 4/25/2025    Name Type Comments Contact Info    Micah Martel MD Referring Provider  [phone]    Steve Dunlap PT Physical Therapist         Last Plan Note     Author: Steve Dunlap PT Status: Sign when Signing Visit Last edited: 4/25/2025  1:30 PM       Physical Therapy Evaluation and Treatment     Patient Name: Maxine Marie  MRN:  [id number]  Encounter date: 4/25/2025  Time Calculation  Start Time: 1342  Stop Time: 1431  Time Calculation (min): 49 min  PT Evaluation Time Entry  PT Evaluation (Moderate) Time Entry: 34  PT Therapeutic Procedures Time Entry  Therapeutic Exercise Time Entry: 15    Visit # 1 of 16  Visits/Dates Authorized: 2025: [employer] - NO AUTH / 100% COVERAGE / 30V/yr - Check w/pt, 0 used per Epic / [employer] [id number]   / ds 4/24/25 //  CPT 20560, 20561 - covered - No auth until auth is required for therapy //      Current Problem:   Problem List Items Addressed This Visit           ICD-10-CM    Left knee pain (Chronic) M25.562    Gait abnormality (Chronic) R26.9    Bursitis of left knee - Primary M70.52    Relevant Orders    Follow Up In Physical Therapy     Precautions:  Precautions  STEADI Fall Risk Score (The score of 4 or more indicates an increased risk of falling): 3       Steadi Fall Risk  One or more falls in the last year? No  How many Times?    Was the patient injured in the fall?    Has trouble stepping onto curb? No  Advised to use a cane or walker to get around safely? No  Often has to rush to toilet? No  Feels unsteady when walking? Yes  Has lost some feeling in feet? No  Often feels sad or depressed? No  Steadies self on furniture while walking at home? No  Takes medicine that makes them feel lightheaded or more tired than usual? Yes  Worried about Falling? No  Takes medicine to sleep or improve mood? No  Needs to push with hands when rising from a chair? Yes      Subjective Evaluation    History of Present Illness  Mechanism of injury: General:  Reason for Referral: Bursitis of left knee [M70.52]  Referred By: Dr Martel  Past Medical History Relevant to Rehab: Right ankle injury, high BMI  Family/Caregiver Present: none     Precautions:   STEADI Fall Risk Score 3  Additional worries with negotiation of stairs or getting in and out of shower secondary to reduced L knee ROM.   Medical Precautions:  "dizziness and fainting, unusual fatigue, anxiety     Medical History Form: Reviewed (scanned into chart)    Allergies: no known allergies     Subjective:    Pt is a 38 y/o female who presents to outpatient physical therapy for complaints of L knee pain. 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 Pt is a mother of 3 kids with a supportive spouse. She reports she is very busy and is on her feet a lot. Notices pain increases when standing up after taking work break. Stiffness and pain increases upon standing after sitting for 15-20 minutes. \"I just feel unstable when I'm walking.\"  Additional complaints of R ankle pain (on the lateral aspect).  Home set-up split level with handrails   1STE no handrail  Functional limitations: work  Assistive device: no  Caregiver / family support system: yes  Aggravating symptoms: stairs, returning to activity levels after 15-20 seated rest break.   Relieving symptoms: rest, Tylenol (as needed), occasionally icing.       Pain  Current pain ratin  At best pain ratin  At worst pain ratin         Pain Assessment: 0-10  0-10 (Numeric) Pain Score: 2  Pain Location: Knee  Pain Orientation: Left    Objective     Tenderness   Left Knee   Tenderness in the ITB, lateral joint line, LCL (proximal), MCL (proximal) and medial joint line. No tenderness in the quadriceps tendon, superior patella and tibial tubercle.     Right Knee   Tenderness in the ITB, LCL (proximal) and MCL (proximal).     Additional Tenderness Details  TTP   Medial and lateral   VMO  Medial and lateral aspects of proximal GS    Active Range of Motion   Left Knee   Flexion: 104 degrees with pain  Extension: WFL    Right Knee   Flexion: 116 degrees   Extension: WFL  Left Ankle/Foot   Plantar flexion: WFL  Inversion: WFL  Eversion: WFL    Right Ankle/Foot   Plantar flexion: WFL  Inversion: WFL  Eversion: WFL    Additional Active Range of Motion Details  Significant tightness in L quad (unable to get to 90 " deg)  Tightness with dorsiflexion and HS stretch       Strength/Myotome Testing     Left Hip   Planes of Motion   Flexion: 3  Extension: 3  Abduction: 3+    Isolated Muscles   Gluteus gustavo: 3-    Right Hip   Planes of Motion   Flexion: 4+  Extension: 4-  Abduction: 4    Isolated Muscles   Gluteus maximums: 4    Left Knee   Flexion: 4  Extension: 4    Right Knee   Flexion: 4+  Extension: 4+    Left Ankle/Foot   Dorsiflexion: 4+  Plantar flexion: 4+    Right Ankle/Foot   Dorsiflexion: 4  Plantar flexion: 4    Additional Strength Details  Hip flexion tested with SLR in supine Unable to perform SLR with resistance on L Leg for hip flexion.     Tests     Left Knee   Positive varus stress test at 30 degrees.   Negative valgus stress test at 30 degrees.     Additional Tests Details  Held further orthopedic testing secondary to current symptoms and avoiding further flare up.    Swelling     Left Knee Girth Measurement (cm)   Joint line: 53 cm    Right Knee Girth Measurement (cm)   Joint line: 52 cm    Ambulation     Ambulation: Stairs   Ascend stairs: independent  Pattern: reciprocal  Railings: two rails  Descend stairs: independent  Pattern: non-reciprocal  Railings: without rails    Additional Stairs Ambulation Details  Step too pattern for descending LLE leading    Observational Gait   Gait: antalgic   Decreased walking speed and stride length.     Additional Observational Gait Details  Reduced WB on LLE       Vitals:       Objective     Tenderness   Left Knee   Tenderness in the ITB, lateral joint line, LCL (proximal), MCL (proximal) and medial joint line. No tenderness in the quadriceps tendon, superior patella and tibial tubercle.     Right Knee   Tenderness in the ITB, LCL (proximal) and MCL (proximal).     Additional Tenderness Details  TTP   Medial and lateral   VMO  Medial and lateral aspects of proximal GS    Active Range of Motion   Left Knee   Flexion: 104 degrees with pain  Extension: WFL    Right Knee    Flexion: 116 degrees   Extension: WFL  Left Ankle/Foot   Plantar flexion: WFL  Inversion: WFL  Eversion: WFL    Right Ankle/Foot   Plantar flexion: WFL  Inversion: WFL  Eversion: WFL    Additional Active Range of Motion Details  Significant tightness in L quad (unable to get to 90 deg)  Tightness with dorsiflexion and HS stretch       Strength/Myotome Testing     Left Hip   Planes of Motion   Flexion: 3  Extension: 3  Abduction: 3+    Isolated Muscles   Gluteus gustavo: 3-    Right Hip   Planes of Motion   Flexion: 4+  Extension: 4-  Abduction: 4    Isolated Muscles   Gluteus maximums: 4    Left Knee   Flexion: 4  Extension: 4    Right Knee   Flexion: 4+  Extension: 4+    Left Ankle/Foot   Dorsiflexion: 4+  Plantar flexion: 4+    Right Ankle/Foot   Dorsiflexion: 4  Plantar flexion: 4    Additional Strength Details  Hip flexion tested with SLR in supine Unable to perform SLR with resistance on L Leg for hip flexion.     Tests     Left Knee   Positive varus stress test at 30 degrees.   Negative valgus stress test at 30 degrees.     Additional Tests Details  Held further orthopedic testing secondary to current symptoms and avoiding further flare up.    Swelling     Left Knee Girth Measurement (cm)   Joint line: 53 cm    Right Knee Girth Measurement (cm)   Joint line: 52 cm    Ambulation     Ambulation: Stairs   Ascend stairs: independent  Pattern: reciprocal  Railings: two rails  Descend stairs: independent  Pattern: non-reciprocal  Railings: without rails    Additional Stairs Ambulation Details  Step too pattern for descending LLE leading    Observational Gait   Gait: antalgic   Decreased walking speed and stride length.     Additional Observational Gait Details  Reduced WB on LLE       Objective     Tenderness   Left Knee   Tenderness in the ITB, lateral joint line, LCL (proximal), MCL (proximal) and medial joint line. No tenderness in the quadriceps tendon, superior patella and tibial tubercle.     Right Knee    Tenderness in the ITB, LCL (proximal) and MCL (proximal).     Additional Tenderness Details  TTP   Medial and lateral   VMO  Medial and lateral aspects of proximal GS    Active Range of Motion   Left Knee   Flexion: 104 degrees with pain  Extension: WFL    Right Knee   Flexion: 116 degrees   Extension: WFL  Left Ankle/Foot   Plantar flexion: WFL  Inversion: WFL  Eversion: WFL    Right Ankle/Foot   Plantar flexion: WFL  Inversion: WFL  Eversion: WFL    Additional Active Range of Motion Details  Significant tightness in L quad (unable to get to 90 deg)  Tightness with dorsiflexion and HS stretch       Strength/Myotome Testing     Left Hip   Planes of Motion   Flexion: 3  Extension: 3  Abduction: 3+    Isolated Muscles   Gluteus gustavo: 3-    Right Hip   Planes of Motion   Flexion: 4+  Extension: 4-  Abduction: 4    Isolated Muscles   Gluteus maximums: 4    Left Knee   Flexion: 4  Extension: 4    Right Knee   Flexion: 4+  Extension: 4+    Left Ankle/Foot   Dorsiflexion: 4+  Plantar flexion: 4+    Right Ankle/Foot   Dorsiflexion: 4  Plantar flexion: 4    Additional Strength Details  Hip flexion tested with SLR in supine Unable to perform SLR with resistance on L Leg for hip flexion.     Tests     Left Knee   Positive varus stress test at 30 degrees.   Negative valgus stress test at 30 degrees.     Additional Tests Details  Held further orthopedic testing secondary to current symptoms and avoiding further flare up.    Swelling     Left Knee Girth Measurement (cm)   Joint line: 53 cm    Right Knee Girth Measurement (cm)   Joint line: 52 cm    Ambulation     Ambulation: Stairs   Ascend stairs: independent  Pattern: reciprocal  Railings: two rails  Descend stairs: independent  Pattern: non-reciprocal  Railings: without rails    Additional Stairs Ambulation Details  Step too pattern for descending LLE leading    Observational Gait   Gait: antalgic   Decreased walking speed and stride length.     Additional Observational  "Gait Details  Reduced WB on LLE            Balance:  Romberg: Firm Eyes Open 30 sec, Eyes Closed 30 sec   Tandem: Firm Eyes Open 20 sec, Eyes Closed <5 sec     Other Outcome Measures:  Other Measures  Other Outcome Measures: LEFS 45/80    Treatments:       Eval 34 min    Therapeutic Exercise 87522:   Extra time spend on performance / education / issuance of HEP  Exercises  - Seated Gluteal Sets  - 2 x daily - 7 x weekly - 3 sets - 10 reps  - Supine Quad Set  - 2 x daily - 7 x weekly - 3 sets - 10 reps  - Supine Bridge with Gluteal Set and Spinal Articulation  - 2 x daily - 7 x weekly - 1 sets - 10 reps  - Supine Heel Slide  - 2 x daily - 7 x weekly - 2 sets - 10 reps    HEP / Access Codes:   Unable to perform SLR secondary to pain  Access Code: [email or web address]  URL: [email or web address]  Date: 04/25/2025  Prepared by: Steve Dunlap    Exercises  - Seated Gluteal Sets  - 2 x daily - 7 x weekly - 3 sets - 10 reps  - Supine Quad Set  - 2 x daily - 7 x weekly - 3 sets - 10 reps  - Supine Bridge with Gluteal Set and Spinal Articulation  - 2 x daily - 7 x weekly - 1 sets - 10 reps  - Supine Heel Slide  - 2 x daily - 7 x weekly - 2 sets - 10 reps    Assessment & Plan     Assessment  Impairments: abnormal gait, abnormal muscle firing, abnormal or restricted ROM, activity intolerance, impaired balance, impaired physical strength, lacks appropriate home exercise program, pain with function and weight-bearing intolerance  Assessment details: Pt is a 38 y/o female who presents to outpatient physical therapy with \"Bursitis of left knee\" resulting in L knee pain. Pt prognosis to therapy is fair secondary to BMI, job related duties/tasks, reduced activity, and pain levels. However, pt is motivated to participate in therapy and come regularly. Pt presents with reduced ROM, strength, alterations in gait/balance, and lacks an appropriate HEP. Pt would benefit from skilled PT services to address deficits noted above, improve strength, " ROM, and balance/gait.  Prognosis: good    Plan  Planned modality interventions: cryotherapy, TENS, ultrasound, thermotherapy (hydrocollator packs), electrical stimulation/Russian stimulation and low level laser therapy  Other planned modality interventions: consider DN potentially however at eval pt is hesitant.  Planned therapy interventions: manual therapy, strengthening, stretching, therapeutic activities, functional ROM exercises, joint mobilization, flexibility, home exercise program, balance/weight-bearing training, gait training, soft tissue mobilization, neuromuscular re-education, motor coordination training, abdominal trunk stabilization, ADL retraining, body mechanics training, IADL retraining, muscle pump exercises, orthotic fitting/training and transfer training  Frequency: 2x week  Duration in visits: 16  Treatment plan discussed with: patient  Plan details: 1-2 x per week for 16 visits          Moderate complexity due to patient's clinical presentation being evolving with changing characteristics, with comorbidities/complexities to include BMI, job related duties, intolerance to WB and lack of activity levels, all of which may negatively impact rehab tolerance and progression.     Post-Treatment Symptoms:       Goals:   Active       PT Problem       PT Goal 1       Start:  04/25/25    Expected End:  05/30/25       Pt to improve pain free AROM of L knee to be comparable with RLE.         PT Goal 2       Start:  04/25/25    Expected End:  06/13/25       Pt will improve LE strength to 4+/5 MMT or greater to improve knee stability and ability to work, lift, titi loads with minimal pain.         PT Goal 3       Start:  04/25/25    Expected End:  06/13/25       Pt to improve LEFS score by x 1 LUCIA to improve ability to squat and perform stairs independently without pain.          Patient Stated Goal 1       Start:  04/25/25    Expected End:  06/13/25       Pt will be able to work a full shift at work with  2/10 pain or less at worst to improve pts ability to manage work related activities.         Patient Stated Goal 2       Start:  04/25/25    Expected End:  06/13/25       Pt will be able to initiate movement and ambulate after taking work break (20-30 min) without pain.                 Current Participants as of 4/25/2025    Name Type Comments Contact Info    Micah Martel MD Referring Provider  [phone]    Signature pending    Steve Dunlap, PT Physical Therapist

## 2025-05-06 ENCOUNTER — TREATMENT (OUTPATIENT)
Dept: PHYSICAL THERAPY | Facility: CLINIC | Age: 40
End: 2025-05-06
Payer: COMMERCIAL

## 2025-05-06 DIAGNOSIS — R26.9 GAIT ABNORMALITY: Primary | Chronic | ICD-10-CM

## 2025-05-06 DIAGNOSIS — M70.52 BURSITIS OF LEFT KNEE: ICD-10-CM

## 2025-05-06 PROCEDURE — 97110 THERAPEUTIC EXERCISES: CPT | Mod: GP,CQ

## 2025-05-06 ASSESSMENT — PAIN - FUNCTIONAL ASSESSMENT: PAIN_FUNCTIONAL_ASSESSMENT: 0-10

## 2025-05-06 ASSESSMENT — PAIN SCALES - GENERAL: PAINLEVEL_OUTOF10: 4

## 2025-05-06 NOTE — PROGRESS NOTES
Physical Therapy Treatment    Patient Name: Maxine Marie  MRN: [id number]  Encounter date: 5/6/2025 PT Received On: 05/06/25  Time Calculation  Start Time: 0345  Stop Time: 0420  Time Calculation (min): 35 min  PT Therapeutic Procedures Time Entry  Therapeutic Exercise Time Entry: 35    Visit # 2 of 10  Visits/Dates Authorized: 2025: [employer] - NO AUTH / 100% COVERAGE / 30V/yr - Check w/pt, 0 used per Epic / [facility] [phone]   / ds 4/24/25 //  CPT 20560, 20561 - covered - No auth until auth is required for therapy //      Current Problem:   Problem List Items Addressed This Visit           ICD-10-CM    Gait abnormality - Primary (Chronic) R26.9    Bursitis of left knee M70.52     Surgery:   Surgery date: n/a    Precautions: EARLENE Fall Risk Score (The score of 4 or more indicates an increased risk of falling): 3         Subjective  remembered she has PTT tendonitis in right foot. Knee pain 4/10  General:        Pre-Treatment Symptoms:   Pain Assessment: 0-10  0-10 (Numeric) Pain Score: 4  Pain Location: Knee  Pain Orientation: Left    Objective   Vitals:   Not limping into department  Not wearing supportive shoes this date          Treatments:        Therapeutic Exercise 53087:     Exercises  -Tball curl in  -SAQ #2 2x10  _HABD with band blue 15x hooklyng  HADD with ball 15x hooklying  Hip extension at counter 15x each pink band  -side step along counter pink to fatigue  Leg curl #45 2x10  Tib raises against wall 12x  TKE ball vs wall 12x  - SLR with toe up and out 8x each  - Seated Gluteal Sets  - 2 x daily - 7 x weekly - 3 sets - 10 reps  - Supine Quad Set  - 2 x daily - 7 x weekly - 3 sets - 10 reps with towel roll  - Supine Bridge with Gluteal Set and Spinal Articulation  - 2 x daily - 7 x weekly - 1 sets - 10 reps  - Supine Heel Slide  - 2 x daily - 7 x weekly - 2 sets - 10 reps    HEP / Access Codes: reprinted for HEP today5/6/2025  Unable to perform SLR secondary to pain  Access Code:  [email or web address]  URL: [email or web address]  Date: 04/25/2025  Prepared by: Steve Dunlap    Exercises  - Seated Gluteal Sets  - 2 x daily - 7 x weekly - 3 sets - 10 reps  - Supine Quad Set  - 2 x daily - 7 x weekly - 3 sets - 10 reps  - Supine Bridge with Gluteal Set and Spinal Articulation  - 2 x daily - 7 x weekly - 1 sets - 10 reps  - Supine Heel Slide  - 2 x daily - 7 x weekly - 2 sets - 10 reps      Modalities: estim or us or DN as aneeded            Assessment: May benefit from modalities such as estim,US or DN not able to try due to attire. Also discussed to not wear crocs due to not supportive enough for excessive pronation. Taping may also be an option for support especially on work days. Patient was given HEP again and will cont with HEP and upgrade to tolerance. No complaints during session.       Post-Treatment Symptoms:   Response to Interventions: No change in pain    Plan: progress as able       Goals:   Active       PT Problem       PT Goal 1       Start:  04/25/25    Expected End:  05/30/25       Pt to improve pain free AROM of L knee to be comparable with RLE.         PT Goal 2       Start:  04/25/25    Expected End:  06/13/25       Pt will improve LE strength to 4+/5 MMT or greater to improve knee stability and ability to work, lift, titi loads with minimal pain.         PT Goal 3       Start:  04/25/25    Expected End:  06/13/25       Pt to improve LEFS score by x 1 LUCIA to improve ability to squat and perform stairs independently without pain.          Patient Stated Goal 1       Start:  04/25/25    Expected End:  06/13/25       Pt will be able to work a full shift at work with 2/10 pain or less at worst to improve pts ability to manage work related activities.         Patient Stated Goal 2       Start:  04/25/25    Expected End:  06/13/25       Pt will be able to initiate movement and ambulate after taking work break (20-30 min) without pain.

## 2025-05-09 ENCOUNTER — TREATMENT (OUTPATIENT)
Dept: PHYSICAL THERAPY | Facility: CLINIC | Age: 40
End: 2025-05-09
Payer: COMMERCIAL

## 2025-05-09 DIAGNOSIS — R26.9 GAIT ABNORMALITY: Primary | Chronic | ICD-10-CM

## 2025-05-09 DIAGNOSIS — M70.52 BURSITIS OF LEFT KNEE: ICD-10-CM

## 2025-05-09 PROCEDURE — 97026 INFRARED THERAPY: CPT | Mod: GP,CQ

## 2025-05-09 PROCEDURE — 97110 THERAPEUTIC EXERCISES: CPT | Mod: GP,CQ

## 2025-05-09 ASSESSMENT — PAIN SCALES - GENERAL: PAINLEVEL_OUTOF10: 5 - MODERATE PAIN

## 2025-05-09 ASSESSMENT — PAIN - FUNCTIONAL ASSESSMENT: PAIN_FUNCTIONAL_ASSESSMENT: 0-10

## 2025-05-09 NOTE — PROGRESS NOTES
Physical Therapy Treatment    Patient Name: Maxine Marie  MRN: [id number]  Encounter date: 5/9/2025 PT Received On: 05/09/25  Time Calculation  Start Time: 1247  Stop Time: 1330  Time Calculation (min): 43 min  PT Modalities Time Entry  Infrared Time Entry: 10  PT Therapeutic Procedures Time Entry  Therapeutic Exercise Time Entry: 30    Visit # 3 of 10  Visits/Dates Authorized: 2025: [employer] - NO AUTH / 100% COVERAGE / 30V/yr - Check w/pt, 0 used per Epic / Availity [id number]   / ds 4/24/25 //  CPT 20560, 20561 - covered - No auth until auth is required for therapy //      Current Problem:   Problem List Items Addressed This Visit           ICD-10-CM    Gait abnormality - Primary (Chronic) R26.9    Bursitis of left knee M70.52       Surgery:   Surgery date: n/a    Precautions: STEADI Fall Risk Score (The score of 4 or more indicates an increased risk of falling): 3         Subjective  Felt good after last session. Ankles very sore today not sure why  General:        Pre-Treatment Symptoms:   Pain Assessment: 0-10  0-10 (Numeric) Pain Score: 5 - Moderate pain  Pain Location: Ankle    Objective   Vitals:   Not limping into department  Not wearing supportive shoes this date          Treatments:        Therapeutic Exercise [id number]:     Exercises  -Tball curl in  -SAQ #2 2x10  _HABD with band blue 15x hooklyng  HADD with ball 15x hooklying  Hip extension at counter 15x each pink band  -side step along counter pink to fatigue  Leg curl #45 2x10  Tib raises against wall 12x  TKE ball vs wall 12x  Ankle PF with tband 10x and also  3x 20 isometrics holds  blue band  - SLR with toe up and out 8x-10x each  - Seated Gluteal Sets  - 2 x daily - 7 x weekly - 3 sets - 10 reps  - Supine Quad Set  - 2 x daily - 7 x weekly - 3 sets - 10 reps with towel roll  - Supine Bridge with Gluteal Set and Spinal Articulation  - 2 x daily - 7 x weekly - 1 sets - 10 reps  - Supine Heel Slide  - 2 x daily - 7 x weekly - 2 sets - 10  reps      Trial of taping with I strip for support inferior and lateral knee left    Light Therapy: Infrared/Red wave length; 10J/cm2 applied with pads; applied to left knee bilateral ankles, in Supine        HEP / Access Codes: reprinted for HEP today5/6/2025  Unable to perform SLR secondary to pain  Access Code: [email or web address]  URL: [email or web address]  Date: 04/25/2025  Prepared by: Steve Dunlap    Exercises  - Seated Gluteal Sets  - 2 x daily - 7 x weekly - 3 sets - 10 reps  - Supine Quad Set  - 2 x daily - 7 x weekly - 3 sets - 10 reps  - Supine Bridge with Gluteal Set and Spinal Articulation  - 2 x daily - 7 x weekly - 1 sets - 10 reps  - Supine Heel Slide  - 2 x daily - 7 x weekly - 2 sets - 10 reps         Assessment:Trial of Light therapy for ankles and taping for knee support.  Patient does well with therex and is compliant with HEP.  Cont to try modalities if responds well.Finding proper foot wear may be challenging, too stiff may increase soreness but too soft not enough support.         Post-Treatment Symptoms:   Response to Interventions: Decrease in pain    Plan: progress as able       Goals:   Active       PT Problem       PT Goal 1       Start:  04/25/25    Expected End:  05/30/25       Pt to improve pain free AROM of L knee to be comparable with RLE.         PT Goal 2       Start:  04/25/25    Expected End:  06/13/25       Pt will improve LE strength to 4+/5 MMT or greater to improve knee stability and ability to work, lift, titi loads with minimal pain.         PT Goal 3       Start:  04/25/25    Expected End:  06/13/25       Pt to improve LEFS score by x 1 LUCIA to improve ability to squat and perform stairs independently without pain.          Patient Stated Goal 1       Start:  04/25/25    Expected End:  06/13/25       Pt will be able to work a full shift at work with 2/10 pain or less at worst to improve pts ability to manage work related activities.         Patient Stated Goal 2       Start:   04/25/25    Expected End:  06/13/25       Pt will be able to initiate movement and ambulate after taking work break (20-30 min) without pain.

## 2025-05-13 ENCOUNTER — APPOINTMENT (OUTPATIENT)
Dept: PHYSICAL THERAPY | Facility: CLINIC | Age: 40
End: 2025-05-13
Payer: COMMERCIAL

## 2025-05-14 ENCOUNTER — APPOINTMENT (OUTPATIENT)
Dept: PHYSICAL THERAPY | Facility: CLINIC | Age: 40
End: 2025-05-14
Payer: COMMERCIAL

## 2025-05-16 ENCOUNTER — APPOINTMENT (OUTPATIENT)
Dept: PHYSICAL THERAPY | Facility: CLINIC | Age: 40
End: 2025-05-16
Payer: COMMERCIAL

## 2025-05-23 ENCOUNTER — APPOINTMENT (OUTPATIENT)
Dept: PHYSICAL THERAPY | Facility: CLINIC | Age: 40
End: 2025-05-23
Payer: COMMERCIAL

## 2025-05-30 ENCOUNTER — APPOINTMENT (OUTPATIENT)
Dept: PHYSICAL THERAPY | Facility: CLINIC | Age: 40
End: 2025-05-30
Payer: COMMERCIAL

## 2025-06-27 ENCOUNTER — HOSPITAL ENCOUNTER (EMERGENCY)
Facility: HOSPITAL | Age: 40
Discharge: HOME | End: 2025-06-27
Attending: STUDENT IN AN ORGANIZED HEALTH CARE EDUCATION/TRAINING PROGRAM
Payer: COMMERCIAL

## 2025-06-27 ENCOUNTER — TELEPHONE (OUTPATIENT)
Dept: SURGERY | Facility: HOSPITAL | Age: 40
End: 2025-06-27

## 2025-06-27 VITALS
DIASTOLIC BLOOD PRESSURE: 81 MMHG | HEIGHT: 65 IN | WEIGHT: 293 LBS | OXYGEN SATURATION: 100 % | HEART RATE: 98 BPM | TEMPERATURE: 98.2 F | RESPIRATION RATE: 16 BRPM | BODY MASS INDEX: 48.82 KG/M2 | SYSTOLIC BLOOD PRESSURE: 135 MMHG

## 2025-06-27 DIAGNOSIS — K64.9 HEMORRHOIDS, UNSPECIFIED HEMORRHOID TYPE: Primary | ICD-10-CM

## 2025-06-27 PROCEDURE — 2500000001 HC RX 250 WO HCPCS SELF ADMINISTERED DRUGS (ALT 637 FOR MEDICARE OP): Performed by: STUDENT IN AN ORGANIZED HEALTH CARE EDUCATION/TRAINING PROGRAM

## 2025-06-27 PROCEDURE — 99283 EMERGENCY DEPT VISIT LOW MDM: CPT | Performed by: STUDENT IN AN ORGANIZED HEALTH CARE EDUCATION/TRAINING PROGRAM

## 2025-06-27 RX ORDER — IBUPROFEN 600 MG/1
600 TABLET, FILM COATED ORAL ONCE
Status: COMPLETED | OUTPATIENT
Start: 2025-06-27 | End: 2025-06-27

## 2025-06-27 RX ORDER — IBUPROFEN 600 MG/1
600 TABLET, FILM COATED ORAL EVERY 6 HOURS PRN
Qty: 28 TABLET | Refills: 0 | Status: SHIPPED | OUTPATIENT
Start: 2025-06-27 | End: 2025-07-04

## 2025-06-27 RX ADMIN — IBUPROFEN 600 MG: 600 TABLET ORAL at 03:06

## 2025-06-27 NOTE — DISCHARGE INSTRUCTIONS
apply Preparation H to the area for pain control.  You can also take Motrin.  Come back if you develop more bleeding, fever, chills, more pain, abdominal pain, nausea, vomiting, fatigue, weakness, palpitations, dizziness chest pain, difficulty breathing, or any other symptoms.

## 2025-06-27 NOTE — ED PROVIDER NOTES
Emergency Department Provider Note       History of Present Illness     History provided by: Patient  Limitations to History: None  External Records Reviewed with Brief Summary:  EMR reviewed    HPI:  Maxine Marie is a 39 y.o. female  presented with a cut in her anus for 4 days.  Patient reported that she fell at home in the bathtub 4 days ago, hit her buttocks area and since then she thinks she has a cut in the anus that is bleeding.  Report pain in the area, blood seen in the toilet when she passed bowel movements, and swelling.  Denies taking blood thinners.  Denies chest pain, difficulty breathing, fatigue, palpitations, dizziness weakness, nausea, vomiting, or any other symptoms.    Physical Exam   Triage vitals:  T 36.8 °C (98.2 °F)  HR 98  /81  RR 16  O2 100 % None (Room air)    Physical Exam  Vitals and nursing note reviewed. Exam conducted with a chaperone present.   Constitutional:       Appearance: Normal appearance.   HENT:      Head: Normocephalic.   Eyes:      Extraocular Movements: Extraocular movements intact.   Cardiovascular:      Rate and Rhythm: Normal rate and regular rhythm.      Heart sounds: Normal heart sounds.   Pulmonary:      Effort: Pulmonary effort is normal.      Breath sounds: Normal breath sounds.   Abdominal:      General: Abdomen is flat. Bowel sounds are normal.      Palpations: Abdomen is soft.   Genitourinary:     Comments: Left sided nonthrombosed hemorrhoid, with an internal and less than 1 cm superficial cut oozing a little bit of blood.   Musculoskeletal:      Cervical back: Normal range of motion and neck supple.   Skin:     General: Skin is warm.   Neurological:      General: No focal deficit present.      Mental Status: She is alert.           Medical Decision Making & ED Course   Medical Decision Makin y.o. female presented with a cut in her anus for 4 days.  Patient reported that she fell at home in the bathtub 4 days ago, hit her buttocks area  and since then she thinks she has a cut in the anus that is bleeding.  Report pain in the area, blood seen in the toilet when she passed bowel movements, and swelling.  Left sided nonthrombosed hemorrhoid, with an internal and less than 1 cm superficial cut oozing a little bit of blood.   Does not require repair.  Patient does not have any anemia symptoms,    No clinical indications for lab work at this moment.  Plan: Pain control, Preparation H, and follow-up with surgery.      Differential diagnoses considered include but are not limited to:   See above    Social Determinants of Health which Significantly Impact Care: Social Determinants of Health which Significantly Impact Care: None identified     EKG Independent Interpretation: EKG not obtained    Independent Result Review and Interpretation: None obtained    Chronic conditions affecting the patient's care: None    The patient was discussed with the following consultants/services: None    Care Considerations: As documented above in MDM    ED Course:  Diagnoses as of 06/27/25 0422   Hemorrhoids, unspecified hemorrhoid type       Disposition   As a result of the work-up, the patient was discharged home.  she was informed of her diagnosis and instructed to come back with any concerns or worsening of condition.  she and was agreeable to the plan as discussed above.  she was given the opportunity to ask questions.  All of the patient's questions were answered.    Procedures   Procedures    Patient was seen independently    Kiersten Armstrong MD  Emergency Medicine                                                       Kiersten Armstrong MD  06/27/25 0256       Kiersten Armstrong MD  06/27/25 0422